# Patient Record
Sex: FEMALE | Race: WHITE | HISPANIC OR LATINO
[De-identification: names, ages, dates, MRNs, and addresses within clinical notes are randomized per-mention and may not be internally consistent; named-entity substitution may affect disease eponyms.]

---

## 2017-04-24 ENCOUNTER — APPOINTMENT (OUTPATIENT)
Dept: SURGICAL ONCOLOGY | Facility: CLINIC | Age: 72
End: 2017-04-24

## 2017-04-24 VITALS
RESPIRATION RATE: 15 BRPM | WEIGHT: 95 LBS | DIASTOLIC BLOOD PRESSURE: 66 MMHG | SYSTOLIC BLOOD PRESSURE: 103 MMHG | BODY MASS INDEX: 17.94 KG/M2 | HEART RATE: 67 BPM | HEIGHT: 61 IN

## 2017-11-02 ENCOUNTER — FORM ENCOUNTER (OUTPATIENT)
Age: 72
End: 2017-11-02

## 2017-11-03 ENCOUNTER — FORM ENCOUNTER (OUTPATIENT)
Age: 72
End: 2017-11-03

## 2017-11-03 ENCOUNTER — OUTPATIENT (OUTPATIENT)
Dept: OUTPATIENT SERVICES | Facility: HOSPITAL | Age: 72
LOS: 1 days | End: 2017-11-03
Payer: MEDICARE

## 2017-11-03 ENCOUNTER — APPOINTMENT (OUTPATIENT)
Dept: MAMMOGRAPHY | Facility: CLINIC | Age: 72
End: 2017-11-03

## 2017-11-03 DIAGNOSIS — Z00.8 ENCOUNTER FOR OTHER GENERAL EXAMINATION: ICD-10-CM

## 2017-11-03 PROCEDURE — G0202: CPT | Mod: 26

## 2017-11-03 PROCEDURE — 77067 SCR MAMMO BI INCL CAD: CPT

## 2017-11-03 PROCEDURE — 77063 BREAST TOMOSYNTHESIS BI: CPT | Mod: 26

## 2017-11-03 PROCEDURE — 77063 BREAST TOMOSYNTHESIS BI: CPT

## 2017-11-04 ENCOUNTER — APPOINTMENT (OUTPATIENT)
Dept: CARDIOLOGY | Facility: CLINIC | Age: 72
End: 2017-11-04

## 2017-11-04 ENCOUNTER — APPOINTMENT (OUTPATIENT)
Dept: CV DIAGNOSITCS | Facility: HOSPITAL | Age: 72
End: 2017-11-04

## 2017-11-04 ENCOUNTER — OUTPATIENT (OUTPATIENT)
Dept: OUTPATIENT SERVICES | Facility: HOSPITAL | Age: 72
LOS: 1 days | End: 2017-11-04
Payer: SUBSIDIZED

## 2017-11-04 DIAGNOSIS — Z00.00 ENCOUNTER FOR GENERAL ADULT MEDICAL EXAMINATION WITHOUT ABNORMAL FINDINGS: ICD-10-CM

## 2017-11-04 PROCEDURE — 93306 TTE W/DOPPLER COMPLETE: CPT

## 2017-11-04 PROCEDURE — 75571 CT HRT W/O DYE W/CA TEST: CPT

## 2017-11-04 PROCEDURE — 75571 CT HRT W/O DYE W/CA TEST: CPT | Mod: 26

## 2017-11-04 PROCEDURE — 93010 ELECTROCARDIOGRAM REPORT: CPT

## 2017-11-04 PROCEDURE — 93306 TTE W/DOPPLER COMPLETE: CPT | Mod: 26

## 2017-11-04 PROCEDURE — 93005 ELECTROCARDIOGRAM TRACING: CPT

## 2017-11-06 ENCOUNTER — RESULT CHARGE (OUTPATIENT)
Age: 72
End: 2017-11-06

## 2017-11-07 ENCOUNTER — OTHER (OUTPATIENT)
Age: 72
End: 2017-11-07

## 2017-11-07 DIAGNOSIS — Z00.00 ENCOUNTER FOR GENERAL ADULT MEDICAL EXAMINATION W/OUT ABNORMAL FINDINGS: ICD-10-CM

## 2018-01-19 ENCOUNTER — APPOINTMENT (OUTPATIENT)
Dept: RADIOLOGY | Facility: CLINIC | Age: 73
End: 2018-01-19

## 2018-01-19 ENCOUNTER — OUTPATIENT (OUTPATIENT)
Dept: OUTPATIENT SERVICES | Facility: HOSPITAL | Age: 73
LOS: 1 days | End: 2018-01-19
Payer: MEDICARE

## 2018-01-19 DIAGNOSIS — Z00.8 ENCOUNTER FOR OTHER GENERAL EXAMINATION: ICD-10-CM

## 2018-01-19 PROCEDURE — 77080 DXA BONE DENSITY AXIAL: CPT

## 2018-01-19 PROCEDURE — 77080 DXA BONE DENSITY AXIAL: CPT | Mod: 26

## 2018-05-14 ENCOUNTER — APPOINTMENT (OUTPATIENT)
Dept: SURGICAL ONCOLOGY | Facility: CLINIC | Age: 73
End: 2018-05-14
Payer: MEDICARE

## 2018-05-14 VITALS
WEIGHT: 96 LBS | DIASTOLIC BLOOD PRESSURE: 64 MMHG | SYSTOLIC BLOOD PRESSURE: 101 MMHG | BODY MASS INDEX: 18.12 KG/M2 | HEIGHT: 61 IN | OXYGEN SATURATION: 98 % | HEART RATE: 65 BPM

## 2018-05-14 PROCEDURE — 99213 OFFICE O/P EST LOW 20 MIN: CPT

## 2018-05-14 RX ORDER — ALENDRONATE SODIUM 70 MG/1
70 TABLET ORAL
Qty: 12 | Refills: 0 | Status: ACTIVE | COMMUNITY
Start: 2018-03-05

## 2018-06-01 ENCOUNTER — APPOINTMENT (OUTPATIENT)
Dept: ENDOCRINOLOGY | Facility: CLINIC | Age: 73
End: 2018-06-01

## 2018-11-14 ENCOUNTER — FORM ENCOUNTER (OUTPATIENT)
Age: 73
End: 2018-11-14

## 2018-11-15 ENCOUNTER — OUTPATIENT (OUTPATIENT)
Dept: OUTPATIENT SERVICES | Facility: HOSPITAL | Age: 73
LOS: 1 days | End: 2018-11-15
Payer: MEDICARE

## 2018-11-15 ENCOUNTER — APPOINTMENT (OUTPATIENT)
Dept: MAMMOGRAPHY | Facility: CLINIC | Age: 73
End: 2018-11-15
Payer: MEDICARE

## 2018-11-15 ENCOUNTER — APPOINTMENT (OUTPATIENT)
Dept: ULTRASOUND IMAGING | Facility: CLINIC | Age: 73
End: 2018-11-15
Payer: MEDICARE

## 2018-11-15 DIAGNOSIS — Z00.8 ENCOUNTER FOR OTHER GENERAL EXAMINATION: ICD-10-CM

## 2018-11-15 PROCEDURE — 77063 BREAST TOMOSYNTHESIS BI: CPT | Mod: 26

## 2018-11-15 PROCEDURE — 76641 ULTRASOUND BREAST COMPLETE: CPT | Mod: 26,50

## 2018-11-15 PROCEDURE — 77067 SCR MAMMO BI INCL CAD: CPT | Mod: 26

## 2018-11-15 PROCEDURE — 77063 BREAST TOMOSYNTHESIS BI: CPT

## 2018-11-15 PROCEDURE — 77067 SCR MAMMO BI INCL CAD: CPT

## 2018-11-15 PROCEDURE — 76641 ULTRASOUND BREAST COMPLETE: CPT

## 2019-06-02 RX ORDER — CALCIUM CARBONATE/VITAMIN D3 600 MG-20
600-800 TABLET,CHEWABLE ORAL
Refills: 0 | Status: ACTIVE | COMMUNITY

## 2019-06-03 ENCOUNTER — APPOINTMENT (OUTPATIENT)
Dept: SURGICAL ONCOLOGY | Facility: CLINIC | Age: 74
End: 2019-06-03
Payer: MEDICARE

## 2019-06-03 VITALS
HEIGHT: 61 IN | HEART RATE: 60 BPM | BODY MASS INDEX: 18.31 KG/M2 | SYSTOLIC BLOOD PRESSURE: 122 MMHG | OXYGEN SATURATION: 99 % | WEIGHT: 97 LBS | DIASTOLIC BLOOD PRESSURE: 79 MMHG

## 2019-06-03 PROCEDURE — 99214 OFFICE O/P EST MOD 30 MIN: CPT

## 2019-06-04 ENCOUNTER — TRANSCRIPTION ENCOUNTER (OUTPATIENT)
Age: 74
End: 2019-06-04

## 2019-11-17 ENCOUNTER — FORM ENCOUNTER (OUTPATIENT)
Age: 74
End: 2019-11-17

## 2019-11-18 ENCOUNTER — APPOINTMENT (OUTPATIENT)
Dept: ULTRASOUND IMAGING | Facility: CLINIC | Age: 74
End: 2019-11-18
Payer: MEDICARE

## 2019-11-18 ENCOUNTER — OUTPATIENT (OUTPATIENT)
Dept: OUTPATIENT SERVICES | Facility: HOSPITAL | Age: 74
LOS: 1 days | End: 2019-11-18
Payer: MEDICARE

## 2019-11-18 ENCOUNTER — APPOINTMENT (OUTPATIENT)
Dept: MAMMOGRAPHY | Facility: CLINIC | Age: 74
End: 2019-11-18
Payer: MEDICARE

## 2019-11-18 DIAGNOSIS — Z00.8 ENCOUNTER FOR OTHER GENERAL EXAMINATION: ICD-10-CM

## 2019-11-18 PROCEDURE — 77063 BREAST TOMOSYNTHESIS BI: CPT | Mod: 26

## 2019-11-18 PROCEDURE — 76641 ULTRASOUND BREAST COMPLETE: CPT | Mod: 26,50

## 2019-11-18 PROCEDURE — 77067 SCR MAMMO BI INCL CAD: CPT | Mod: 26

## 2019-11-18 PROCEDURE — 77063 BREAST TOMOSYNTHESIS BI: CPT

## 2019-11-18 PROCEDURE — 76641 ULTRASOUND BREAST COMPLETE: CPT

## 2019-11-18 PROCEDURE — 77067 SCR MAMMO BI INCL CAD: CPT

## 2020-06-08 ENCOUNTER — APPOINTMENT (OUTPATIENT)
Dept: SURGICAL ONCOLOGY | Facility: CLINIC | Age: 75
End: 2020-06-08

## 2020-09-29 NOTE — ASSESSMENT
[FreeTextEntry1] : 11-15-18.\par Bilateral mammogram and breast ultrasound @[facility] were normal, BI-RADS 2.\par Yearly breast imaging will be November 2019, Prescription entered today\par \par A breast MRI January 2015 @[facility], was BI-RADS-2.\par \par Clinically she is doing well and asked to see her in another year, sooner if needed\par \par \par 11/18/19:\par Annual bilateral mammogram and sonogram @[facility]: BI-RADS 2.\par Prescription for November 2020 entered 04/19/20\par \par \par

## 2020-09-29 NOTE — PHYSICAL EXAM
[Normal] : supple, no neck mass and thyroid not enlarged [Normal Neck Lymph Nodes] : normal neck lymph nodes  [Normal Supraclavicular Lymph Nodes] : normal supraclavicular lymph nodes [Normal Axillary Lymph Nodes] : normal axillary lymph nodes [Normal] : normal appearance, no rash, nodules, vesicles, ulcers, erythema [de-identified] : Groins not examined [de-identified] : see below

## 2020-09-29 NOTE — REVIEW OF SYSTEMS
[Negative] : Integumentary [FreeTextEntry5] : Hypertension [de-identified] : Osteoporosis [FreeTextEntry1] : Breast cancer

## 2020-09-29 NOTE — HISTORY OF PRESENT ILLNESS
[de-identified] : 73 year-old lady who March 2005 had right breast conserving therapy for stage I right breast cancer.([id number]). \par She received radiation therapy from Dr. Pat Rosa and saw Dr. Alfredito Roy from medical oncology who recommended anastrozole. \par She has been followed carefully clinically and is asymptomatic.\par \par No relatives with breast or ovarian cancer\par \par Gynecologist is Dr. Starla KRAMER: December 2017 was unremarkable\par \par Internist was Dr. Lela Benitez\par NOW: DR Lit MARTE\par \par She does not have a pacemaker defibrillator.\par She takes no anticoagulants.\par \par Blood pressure is controlled with diet.\par She takes Crestor for hyperlipidemia.\par Osteoporosis is treated with alendronate, and vitamin D and calcium supplements.\par \par Colonoscopy Dr. Grayson: August 2018 was unremarkable.\par A repeat in 5 years, 2023.............................\par \par Her  passed away October 2017 (see below)\par (Her  has had a very protracted course of the right carotid endarterectomy in November 2016 by Dr. 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 \par He required a PEG tube for feeding purposes, but that migrated into the colon. \par He developed hematuria  from his previous prostate Procedures and required CBI and blood transfusions, Under the supervision of Dr. Gary Goldberg. )\par

## 2020-11-19 ENCOUNTER — RESULT REVIEW (OUTPATIENT)
Age: 75
End: 2020-11-19

## 2020-11-19 ENCOUNTER — APPOINTMENT (OUTPATIENT)
Dept: ULTRASOUND IMAGING | Facility: CLINIC | Age: 75
End: 2020-11-19
Payer: MEDICARE

## 2020-11-19 ENCOUNTER — OUTPATIENT (OUTPATIENT)
Dept: OUTPATIENT SERVICES | Facility: HOSPITAL | Age: 75
LOS: 1 days | End: 2020-11-19
Payer: MEDICARE

## 2020-11-19 ENCOUNTER — APPOINTMENT (OUTPATIENT)
Dept: MAMMOGRAPHY | Facility: CLINIC | Age: 75
End: 2020-11-19
Payer: MEDICARE

## 2020-11-19 DIAGNOSIS — Z00.8 ENCOUNTER FOR OTHER GENERAL EXAMINATION: ICD-10-CM

## 2020-11-19 PROCEDURE — 77067 SCR MAMMO BI INCL CAD: CPT | Mod: 26

## 2020-11-19 PROCEDURE — 76641 ULTRASOUND BREAST COMPLETE: CPT

## 2020-11-19 PROCEDURE — 76641 ULTRASOUND BREAST COMPLETE: CPT | Mod: 26,50

## 2020-11-19 PROCEDURE — 77063 BREAST TOMOSYNTHESIS BI: CPT | Mod: 26

## 2020-11-19 PROCEDURE — 77067 SCR MAMMO BI INCL CAD: CPT

## 2020-11-19 PROCEDURE — 77063 BREAST TOMOSYNTHESIS BI: CPT

## 2020-12-03 ENCOUNTER — APPOINTMENT (OUTPATIENT)
Dept: SURGICAL ONCOLOGY | Facility: CLINIC | Age: 75
End: 2020-12-03
Payer: MEDICARE

## 2020-12-03 VITALS
HEIGHT: 61 IN | BODY MASS INDEX: 18.5 KG/M2 | SYSTOLIC BLOOD PRESSURE: 124 MMHG | WEIGHT: 98 LBS | DIASTOLIC BLOOD PRESSURE: 78 MMHG | HEART RATE: 73 BPM | OXYGEN SATURATION: 97 %

## 2020-12-03 PROCEDURE — 99214 OFFICE O/P EST MOD 30 MIN: CPT

## 2020-12-03 NOTE — ASSESSMENT
[FreeTextEntry1] : Clinically doing well.\par \par January 2015 breast : BI-RADS 2.\par No specific MRI follow-up recommended.\par \par November 2020:\par Bilateral mammogram and sonogram at [facility]: BI-RADS 2.\par Prescription entered for November 2021.\par \par We should see her after the above imaging, sooner if needed

## 2020-12-03 NOTE — HISTORY OF PRESENT ILLNESS
[de-identified] : 75 year-old lady who 2005 had RIGHT BREAST conserving therapy for stage I right breast cancer.([id number]). \par \par + XRT: Dr. Pat Rosa\par Medical oncology: Dr. Alfredito Roy:  Anastrozole. \par \par She has been followed carefully clinically and is asymptomatic.\par \par + FH:\par A paternal cousin had breast cancer at age 35, and  shortly thereafter.\par A maternal cousin had breast cancer in her 50s.\par \par No relatives with ovarian cancer.\par \par Not Ashkenazi.\par \par At least 3 paternal aunts had intra-abdominal malignancies, no further details available.\par \par \par In the spring 2019 she lost her hearing in her left ear.\par She saw an ENT specialist, but does not recall the details.\par Irrespective, no cause or diagnosis was ever established.\par \par \par Internist was Dr. Lela Benitez\par NOW: DR Lit MARTE\par \par She does not have a pacemaker defibrillator.\par She takes no anticoagulants.\par \par Blood pressure is controlled with diet.\par She takes Crestor for hyperlipidemia.\par She does not see a cardiologist\par \par Osteoporosis is treated with alendronate, and vitamin D and calcium supplements.\par Her endocrinologist is at [place]\par \par \par Gynecologist is Dr. Starla KRAMER: 2018 was unremarkable.\par Follow-up pending\par \par \par \par Colonoscopy Dr. Andrews Grayson: 2018 was unremarkable.\par Will repeat in 5 years, .............................\par \par Her  passed away 2017 (see below)\par (Her  has had a very protracted course of the right carotid endarterectomy in 2016 by Dr. 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 \par He required a PEG tube for feeding purposes, but that migrated into the colon. \par He developed hematuria  from his previous prostate Procedures and required CBI and blood transfusions, Under the supervision of Dr. Gary Goldberg. )\par

## 2020-12-03 NOTE — REASON FOR VISIT
[Follow-Up Visit] : a follow-up visit for [Other: _____] : [unfilled] [FreeTextEntry2] : Stage I right breast cancer

## 2020-12-03 NOTE — REVIEW OF SYSTEMS
[Negative] : Integumentary [FreeTextEntry5] : Hypertension [de-identified] : Osteoporosis [FreeTextEntry1] : Breast cancer

## 2020-12-03 NOTE — PHYSICAL EXAM
[Normal] : supple, no neck mass and thyroid not enlarged [Normal Neck Lymph Nodes] : normal neck lymph nodes  [Normal Supraclavicular Lymph Nodes] : normal supraclavicular lymph nodes [Normal Axillary Lymph Nodes] : normal axillary lymph nodes [Normal] : normal appearance, no rash, nodules, vesicles, ulcers, erythema [de-identified] : Groins not examined [de-identified] : see below

## 2021-06-04 ENCOUNTER — RESULT REVIEW (OUTPATIENT)
Age: 76
End: 2021-06-04

## 2021-10-29 ENCOUNTER — TRANSCRIPTION ENCOUNTER (OUTPATIENT)
Age: 76
End: 2021-10-29

## 2021-11-22 ENCOUNTER — APPOINTMENT (OUTPATIENT)
Dept: MAMMOGRAPHY | Facility: CLINIC | Age: 76
End: 2021-11-22
Payer: MEDICARE

## 2021-11-22 ENCOUNTER — RESULT REVIEW (OUTPATIENT)
Age: 76
End: 2021-11-22

## 2021-11-22 ENCOUNTER — OUTPATIENT (OUTPATIENT)
Dept: OUTPATIENT SERVICES | Facility: HOSPITAL | Age: 76
LOS: 1 days | End: 2021-11-22
Payer: MEDICARE

## 2021-11-22 ENCOUNTER — APPOINTMENT (OUTPATIENT)
Dept: ULTRASOUND IMAGING | Facility: CLINIC | Age: 76
End: 2021-11-22
Payer: MEDICARE

## 2021-11-22 DIAGNOSIS — C50.911 MALIGNANT NEOPLASM OF UNSPECIFIED SITE OF RIGHT FEMALE BREAST: ICD-10-CM

## 2021-11-22 PROCEDURE — 76641 ULTRASOUND BREAST COMPLETE: CPT | Mod: 26,50

## 2021-11-22 PROCEDURE — 77067 SCR MAMMO BI INCL CAD: CPT | Mod: 26

## 2021-11-22 PROCEDURE — 76641 ULTRASOUND BREAST COMPLETE: CPT

## 2021-11-22 PROCEDURE — 77067 SCR MAMMO BI INCL CAD: CPT

## 2021-11-22 PROCEDURE — 77063 BREAST TOMOSYNTHESIS BI: CPT | Mod: 26

## 2021-11-22 PROCEDURE — 77063 BREAST TOMOSYNTHESIS BI: CPT

## 2021-12-07 ENCOUNTER — TRANSCRIPTION ENCOUNTER (OUTPATIENT)
Age: 76
End: 2021-12-07

## 2021-12-27 ENCOUNTER — TRANSCRIPTION ENCOUNTER (OUTPATIENT)
Age: 76
End: 2021-12-27

## 2022-01-03 ENCOUNTER — APPOINTMENT (OUTPATIENT)
Dept: SURGICAL ONCOLOGY | Facility: CLINIC | Age: 77
End: 2022-01-03
Payer: MEDICARE

## 2022-01-03 VITALS
SYSTOLIC BLOOD PRESSURE: 144 MMHG | RESPIRATION RATE: 17 BRPM | DIASTOLIC BLOOD PRESSURE: 79 MMHG | WEIGHT: 100 LBS | OXYGEN SATURATION: 99 % | HEIGHT: 61 IN | HEART RATE: 56 BPM | TEMPERATURE: 98.1 F | BODY MASS INDEX: 18.88 KG/M2

## 2022-01-03 PROCEDURE — 99214 OFFICE O/P EST MOD 30 MIN: CPT

## 2022-01-03 NOTE — ASSESSMENT
[FreeTextEntry1] : Clinically doing well.\par \par January 2015 breast : BI-RADS 2.\par No specific MRI follow-up recommended.\par \par November 2021:\par Bilateral mammogram and sonogram at [facility]: BI-RADS 2.\par Prescription entered for November 2022.\par \par We should see her after the above imaging, sooner if needed

## 2022-01-03 NOTE — PHYSICAL EXAM
[Normal] : supple, no neck mass and thyroid not enlarged [Normal Neck Lymph Nodes] : normal neck lymph nodes  [Normal Supraclavicular Lymph Nodes] : normal supraclavicular lymph nodes [Normal Axillary Lymph Nodes] : normal axillary lymph nodes [Normal] : normal appearance, no rash, nodules, vesicles, ulcers, erythema [de-identified] : Groins not examined [de-identified] : see below

## 2022-01-03 NOTE — HISTORY OF PRESENT ILLNESS
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 \par \par + XRT: Dr. Pat COELLO\par Medical oncology: Dr. Alfredito CONCEPCION:  Anastrozole. \par \par She has been followed carefully clinically and is asymptomatic.\par \par + FH:\par A paternal cousin had breast cancer at age 35, and  shortly thereafter.\par A maternal cousin had breast cancer in her 50s.\par \par No relatives with ovarian cancer.\par \par Not Ashkenazi.\par \par At least 3 paternal aunts had intra-abdominal malignancies, no further details available.\par \par \par Reproductive history:\par Menarche at 14.\par  2, para 2, first birth at 31.\par Natural menopause at 50.\par No HRT.\par \par \par Genetic testing:..............\par \par \par \par In the spring 2019 she lost her hearing in her left ear.\par She saw an ENT specialist, but does not recall the details.\par Irrespective, no cause or diagnosis was ever established.\par \par \par Internist was Dr. Lela Benitez\par NOW: DR Lit MARTE\par \par She does not have a pacemaker defibrillator.\par She takes no anticoagulants.\par \par Blood pressure is controlled with diet.\par She takes Crestor for hyperlipidemia.\par She does not see a cardiologist\par \par Osteoporosis is treated with alendronate, and vitamin D and calcium supplements.\par Her endocrinologist is at [place].\par \par She has been diagnosed as "prediabetic" by her internist.\par This is being followed clinically.\par \par  she developed diminished hearing in her left ear from an unclear etiology.\par She now wears a unilateral hearing aid.\par ENT: Dr. Arturo HENSLEY\par \par \par Gynecologist is Dr. Starla KRAMER: 2021 was unremarkable.\par \par \par Colonoscopy Dr. Andrews Grayson: 2018 was unremarkable.\par Will repeat in 5 years, .............................\par \par Her  passed away 2017 (see below)\par (Her  has had a very protracted course of the right carotid endarterectomy in 2016 by Dr. 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 \par He required a PEG tube for feeding purposes, but that migrated into the colon. \par He developed hematuria  from his previous prostate Procedures and required CBI and blood transfusions, Under the supervision of Dr. Gary Goldberg. )\par

## 2022-01-03 NOTE — REVIEW OF SYSTEMS
[Negative] : Integumentary [FreeTextEntry5] : Hypercholesterolemia [de-identified] : Osteoporosis [FreeTextEntry1] : Breast cancer

## 2022-01-03 NOTE — REASON FOR VISIT
[Follow-Up Visit] : a follow-up visit for [Other: _____] : [unfilled] [FreeTextEntry2] : Stage I right breast cancer (annual visit)

## 2022-11-15 ENCOUNTER — NON-APPOINTMENT (OUTPATIENT)
Age: 77
End: 2022-11-15

## 2022-11-28 ENCOUNTER — APPOINTMENT (OUTPATIENT)
Dept: MAMMOGRAPHY | Facility: CLINIC | Age: 77
End: 2022-11-28

## 2022-11-28 ENCOUNTER — RESULT REVIEW (OUTPATIENT)
Age: 77
End: 2022-11-28

## 2022-11-28 ENCOUNTER — APPOINTMENT (OUTPATIENT)
Dept: ULTRASOUND IMAGING | Facility: CLINIC | Age: 77
End: 2022-11-28

## 2022-11-28 ENCOUNTER — OUTPATIENT (OUTPATIENT)
Dept: OUTPATIENT SERVICES | Facility: HOSPITAL | Age: 77
LOS: 1 days | End: 2022-11-28
Payer: MEDICARE

## 2022-11-28 DIAGNOSIS — C50.911 MALIGNANT NEOPLASM OF UNSPECIFIED SITE OF RIGHT FEMALE BREAST: ICD-10-CM

## 2022-11-28 PROCEDURE — 77067 SCR MAMMO BI INCL CAD: CPT | Mod: 26

## 2022-11-28 PROCEDURE — 76641 ULTRASOUND BREAST COMPLETE: CPT | Mod: 26,50

## 2022-11-28 PROCEDURE — 77063 BREAST TOMOSYNTHESIS BI: CPT

## 2022-11-28 PROCEDURE — 76641 ULTRASOUND BREAST COMPLETE: CPT

## 2022-11-28 PROCEDURE — 77063 BREAST TOMOSYNTHESIS BI: CPT | Mod: 26

## 2022-11-28 PROCEDURE — 77067 SCR MAMMO BI INCL CAD: CPT

## 2023-01-03 ENCOUNTER — NON-APPOINTMENT (OUTPATIENT)
Age: 78
End: 2023-01-03

## 2023-01-04 ENCOUNTER — EMERGENCY (EMERGENCY)
Facility: HOSPITAL | Age: 78
LOS: 1 days | Discharge: HOME | End: 2023-01-04
Attending: EMERGENCY MEDICINE | Admitting: EMERGENCY MEDICINE
Payer: MEDICARE

## 2023-01-04 VITALS
SYSTOLIC BLOOD PRESSURE: 149 MMHG | OXYGEN SATURATION: 98 % | RESPIRATION RATE: 16 BRPM | DIASTOLIC BLOOD PRESSURE: 75 MMHG | TEMPERATURE: 100 F | HEART RATE: 84 BPM | WEIGHT: 100.09 LBS

## 2023-01-04 VITALS
HEART RATE: 62 BPM | OXYGEN SATURATION: 97 % | TEMPERATURE: 99 F | RESPIRATION RATE: 17 BRPM | SYSTOLIC BLOOD PRESSURE: 137 MMHG | DIASTOLIC BLOOD PRESSURE: 86 MMHG

## 2023-01-04 LAB
ALBUMIN SERPL ELPH-MCNC: 3.5 G/DL — SIGNIFICANT CHANGE UP (ref 3.3–5)
ALP SERPL-CCNC: 54 U/L — SIGNIFICANT CHANGE UP (ref 40–120)
ALT FLD-CCNC: 35 U/L — SIGNIFICANT CHANGE UP (ref 12–78)
ANION GAP SERPL CALC-SCNC: 7 MMOL/L — SIGNIFICANT CHANGE UP (ref 5–17)
AST SERPL-CCNC: 30 U/L — SIGNIFICANT CHANGE UP (ref 15–37)
BASOPHILS # BLD AUTO: 0.03 K/UL — SIGNIFICANT CHANGE UP (ref 0–0.2)
BASOPHILS NFR BLD AUTO: 0.6 % — SIGNIFICANT CHANGE UP (ref 0–2)
BILIRUB SERPL-MCNC: 0.5 MG/DL — SIGNIFICANT CHANGE UP (ref 0.2–1.2)
BUN SERPL-MCNC: 13 MG/DL — SIGNIFICANT CHANGE UP (ref 7–23)
CALCIUM SERPL-MCNC: 9 MG/DL — SIGNIFICANT CHANGE UP (ref 8.5–10.1)
CHLORIDE SERPL-SCNC: 107 MMOL/L — SIGNIFICANT CHANGE UP (ref 96–108)
CO2 SERPL-SCNC: 27 MMOL/L — SIGNIFICANT CHANGE UP (ref 22–31)
CREAT SERPL-MCNC: 0.63 MG/DL — SIGNIFICANT CHANGE UP (ref 0.5–1.3)
EGFR: 92 ML/MIN/1.73M2 — SIGNIFICANT CHANGE UP
EOSINOPHIL # BLD AUTO: 0.06 K/UL — SIGNIFICANT CHANGE UP (ref 0–0.5)
EOSINOPHIL NFR BLD AUTO: 1.3 % — SIGNIFICANT CHANGE UP (ref 0–6)
GLUCOSE SERPL-MCNC: 89 MG/DL — SIGNIFICANT CHANGE UP (ref 70–99)
HCT VFR BLD CALC: 42.3 % — SIGNIFICANT CHANGE UP (ref 34.5–45)
HGB BLD-MCNC: 13.9 G/DL — SIGNIFICANT CHANGE UP (ref 11.5–15.5)
IMM GRANULOCYTES NFR BLD AUTO: 0.2 % — SIGNIFICANT CHANGE UP (ref 0–0.9)
LYMPHOCYTES # BLD AUTO: 1.32 K/UL — SIGNIFICANT CHANGE UP (ref 1–3.3)
LYMPHOCYTES # BLD AUTO: 28 % — SIGNIFICANT CHANGE UP (ref 13–44)
MCHC RBC-ENTMCNC: 29.6 PG — SIGNIFICANT CHANGE UP (ref 27–34)
MCHC RBC-ENTMCNC: 32.9 GM/DL — SIGNIFICANT CHANGE UP (ref 32–36)
MCV RBC AUTO: 90 FL — SIGNIFICANT CHANGE UP (ref 80–100)
MONOCYTES # BLD AUTO: 0.28 K/UL — SIGNIFICANT CHANGE UP (ref 0–0.9)
MONOCYTES NFR BLD AUTO: 5.9 % — SIGNIFICANT CHANGE UP (ref 2–14)
NEUTROPHILS # BLD AUTO: 3.02 K/UL — SIGNIFICANT CHANGE UP (ref 1.8–7.4)
NEUTROPHILS NFR BLD AUTO: 64 % — SIGNIFICANT CHANGE UP (ref 43–77)
NRBC # BLD: 0 /100 WBCS — SIGNIFICANT CHANGE UP (ref 0–0)
PLATELET # BLD AUTO: 207 K/UL — SIGNIFICANT CHANGE UP (ref 150–400)
POTASSIUM SERPL-MCNC: 3.6 MMOL/L — SIGNIFICANT CHANGE UP (ref 3.5–5.3)
POTASSIUM SERPL-SCNC: 3.6 MMOL/L — SIGNIFICANT CHANGE UP (ref 3.5–5.3)
PROT SERPL-MCNC: 6.7 G/DL — SIGNIFICANT CHANGE UP (ref 6–8.3)
RBC # BLD: 4.7 M/UL — SIGNIFICANT CHANGE UP (ref 3.8–5.2)
RBC # FLD: 12.9 % — SIGNIFICANT CHANGE UP (ref 10.3–14.5)
SODIUM SERPL-SCNC: 141 MMOL/L — SIGNIFICANT CHANGE UP (ref 135–145)
WBC # BLD: 4.72 K/UL — SIGNIFICANT CHANGE UP (ref 3.8–10.5)
WBC # FLD AUTO: 4.72 K/UL — SIGNIFICANT CHANGE UP (ref 3.8–10.5)

## 2023-01-04 PROCEDURE — 99284 EMERGENCY DEPT VISIT MOD MDM: CPT | Mod: 25

## 2023-01-04 PROCEDURE — 70481 CT ORBIT/EAR/FOSSA W/DYE: CPT | Mod: 26,ME

## 2023-01-04 PROCEDURE — 96366 THER/PROPH/DIAG IV INF ADDON: CPT

## 2023-01-04 PROCEDURE — 80053 COMPREHEN METABOLIC PANEL: CPT

## 2023-01-04 PROCEDURE — 36415 COLL VENOUS BLD VENIPUNCTURE: CPT

## 2023-01-04 PROCEDURE — 85025 COMPLETE CBC W/AUTO DIFF WBC: CPT

## 2023-01-04 PROCEDURE — G1004: CPT

## 2023-01-04 PROCEDURE — 99285 EMERGENCY DEPT VISIT HI MDM: CPT | Mod: FS

## 2023-01-04 PROCEDURE — 96365 THER/PROPH/DIAG IV INF INIT: CPT | Mod: XU

## 2023-01-04 PROCEDURE — 70481 CT ORBIT/EAR/FOSSA W/DYE: CPT | Mod: ME

## 2023-01-04 RX ORDER — AMPICILLIN SODIUM AND SULBACTAM SODIUM 250; 125 MG/ML; MG/ML
3 INJECTION, POWDER, FOR SUSPENSION INTRAMUSCULAR; INTRAVENOUS ONCE
Refills: 0 | Status: COMPLETED | OUTPATIENT
Start: 2023-01-04 | End: 2023-01-04

## 2023-01-04 RX ORDER — ERYTHROMYCIN BASE 5 MG/GRAM
1 OINTMENT (GRAM) OPHTHALMIC (EYE)
Qty: 1 | Refills: 0
Start: 2023-01-04 | End: 2023-01-08

## 2023-01-04 RX ORDER — SODIUM CHLORIDE 9 MG/ML
1000 INJECTION INTRAMUSCULAR; INTRAVENOUS; SUBCUTANEOUS ONCE
Refills: 0 | Status: COMPLETED | OUTPATIENT
Start: 2023-01-04 | End: 2023-01-04

## 2023-01-04 RX ADMIN — AMPICILLIN SODIUM AND SULBACTAM SODIUM 200 GRAM(S): 250; 125 INJECTION, POWDER, FOR SUSPENSION INTRAMUSCULAR; INTRAVENOUS at 21:11

## 2023-01-04 RX ADMIN — SODIUM CHLORIDE 2000 MILLILITER(S): 9 INJECTION INTRAMUSCULAR; INTRAVENOUS; SUBCUTANEOUS at 21:11

## 2023-01-04 NOTE — ED PROVIDER NOTE - OBJECTIVE STATEMENT
77-year-old female sent in from urgent care for redness and swelling around left eye which started a few days ago.  Patient states that she had some discharge from left eye.  Denies fever, chills, eye pain, visual changes, Chest pain, shortness of breath, headache, dizziness, syncope, abdominal pain, nausea, vomiting, upper or lower extremity weakness or paresthesias.  Patient wears glasses, no contacts.  No trauma.

## 2023-01-04 NOTE — ED PROVIDER NOTE - EYE, LEFT
+ swelling to left lower eyelid, no discharge, + swelling and erythema surround left lower eye./pupils equal, round, and reactive to light/CONJUNCTIVA ERYTHEMA

## 2023-01-04 NOTE — ED PROVIDER NOTE - NS ED ATTENDING STATEMENT MOD
This was a shared visit with the NGOZI. I reviewed and verified the documentation and independently performed the documented:

## 2023-01-04 NOTE — ED PROVIDER NOTE - NSFOLLOWUPINSTRUCTIONS_ED_ALL_ED_FT
Periorbital Cellulitis    WHAT YOU NEED TO KNOW:    Periorbital cellulitis, or preseptal cellulitis, is a bacterial infection of your eyelid or the skin around your eye. . The infection can develop from a scratch or insect bite around the eye. Periorbital cellulitis may cause vision problems. It should be treated as soon as possible to prevent complications.   Cellulitis of the Eye         DISCHARGE INSTRUCTIONS:    Return to the emergency department if:   •You lose vision in your infected eye.      •You have vision problems, such as double vision.      •You have difficulty moving your eyeball.      •You cannot close your eye due to swelling.      •You develop a headache and are vomiting.      •You have a stiff neck.      •You see red streaks coming from the infected area.      Call your doctor if:   •Your symptoms do not get better within 24 hours of treatment.      •The red, warm, swollen area gets larger.      •Your fever or pain does not go away or gets worse.      •You have questions or concerns about your condition or care.      Medicines: If your periorbital cellulitis is severe, you may need IV antibiotics in the hospital. If the infection is not treated, it can spread through your body and become life-threatening. You may need any of the following medicines:  •Antibiotics help treat a bacterial infection.      •Acetaminophen decreases pain and fever. It is available without a doctor's order. Ask how much to take and how often to take it. Follow directions. Read the labels of all other medicines you are using to see if they also contain acetaminophen, or ask your doctor or pharmacist. Acetaminophen can cause liver damage if not taken correctly.      •NSAIDs, such as ibuprofen, help decrease swelling, pain, and fever. This medicine is available with or without a doctor's order. NSAIDs can cause stomach bleeding or kidney problems in certain people. If you take blood thinner medicine, always ask your healthcare provider if NSAIDs are safe for you. Always read the medicine label and follow directions.      •Take your medicine as directed. Contact your healthcare provider if you think your medicine is not helping or if you have side effects. Tell your provider if you are allergic to any medicine. Keep a list of the medicines, vitamins, and herbs you take. Include the amounts, and when and why you take them. Bring the list or the pill bottles to follow-up visits. Carry your medicine list with you in case of an emergency.      Self-care:   •Wash the area with soap and water every day. Gently pat dry. Use bandages if directed by your healthcare provider.      •Do not rub or scratch your eyes. This can increase your risk for spreading the infection.       •Place a cool, damp cloth on the area. Use clean cloths and clean water. You can do this as often as you need to. Cool, damp cloths may help decrease pain.      •Wash your hands often. Use soap and water. Wash your hands after you use the bathroom, change a child's diapers, or sneeze. Wash your hands before you prepare or eat food. Use lotion to prevent dry, cracked skin.  Handwashing           Prevent another eye infection:   •Wear proper safety equipment. Protect your face from injury during sports and other activities.      •Keep wounds clean and dry. Clean wounds on the face with soap and water. Cover wounds with a dry bandage if needed.       Follow up with your doctor or ophthalmologist as directed: He or she will check if your cellulitis is getting better. Write down your questions so you remember to ask them during your visits.

## 2023-01-04 NOTE — ED PROVIDER NOTE - CLINICAL SUMMARY MEDICAL DECISION MAKING FREE TEXT BOX
76 y/o F sent in from urgent care for left eye redness.  pt with conjunctival injection and surrounding edema/erythema inferior to left eye with purulent discharge.  Concern for conjunctivitis, orbital vs periorbital cellulitis.  CT, labs, antibiotics.

## 2023-01-04 NOTE — ED ADULT NURSE NOTE - OBJECTIVE STATEMENT
Pt. received alert and oriented x3 with chief complaint of left eye swelling w/ discharge for 2 days.

## 2023-01-04 NOTE — ED PROVIDER NOTE - PATIENT PORTAL LINK FT
You can access the FollowMyHealth Patient Portal offered by [facility] by registering at the following website: [email or web address]. By joining [employer]’s FollowMyHealth portal, you will also be able to view your health information using other applications (apps) compatible with our system.

## 2023-01-04 NOTE — ED ADULT TRIAGE NOTE - MODE OF ARRIVAL
Problem: Physical Therapy Goal  Goal: Physical Therapy Goal  Goals to be met by: 2017     Patient will increase functional independence with mobility by performin. Supine to sit with Modified Hood  2. Sit to stand transfer with Modified Hood  3. Gait x 200 feet with Modified Hood using Rolling Walker.   Outcome: Ongoing (interventions implemented as appropriate)  Recommend Home with HH OT and PT progress to out patient therapies  No DME needs       Private [employer] Walk in

## 2023-01-05 RX ADMIN — AMPICILLIN SODIUM AND SULBACTAM SODIUM 3 GRAM(S): 250; 125 INJECTION, POWDER, FOR SUSPENSION INTRAMUSCULAR; INTRAVENOUS at 00:00

## 2023-01-05 RX ADMIN — SODIUM CHLORIDE 1000 MILLILITER(S): 9 INJECTION INTRAMUSCULAR; INTRAVENOUS; SUBCUTANEOUS at 00:00

## 2023-01-09 ENCOUNTER — APPOINTMENT (OUTPATIENT)
Dept: SURGICAL ONCOLOGY | Facility: CLINIC | Age: 78
End: 2023-01-09
Payer: MEDICARE

## 2023-01-09 VITALS
HEART RATE: 63 BPM | BODY MASS INDEX: 18.88 KG/M2 | RESPIRATION RATE: 16 BRPM | WEIGHT: 100 LBS | HEIGHT: 61 IN | OXYGEN SATURATION: 99 % | SYSTOLIC BLOOD PRESSURE: 121 MMHG | DIASTOLIC BLOOD PRESSURE: 74 MMHG

## 2023-01-09 PROCEDURE — 99214 OFFICE O/P EST MOD 30 MIN: CPT

## 2023-01-09 NOTE — ASSESSMENT
[FreeTextEntry1] : Clinically doing well.\par \par January 2015 breast : BI-RADS 2.\par No specific MRI follow-up recommended.\par \par November 2022:\par Bilateral mammogram and sonogram at [place]: BI-RADS 2.\par Prescription entered for November 2023.\par \par We should see her after the above imaging, sooner if needed

## 2023-01-09 NOTE — HISTORY OF PRESENT ILLNESS
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 \par \par + XRT: Dr. Pat COELLO\par Medical oncology: Dr. Alfredito CONCEPCION:  Anastrozole. \par \par She has been followed carefully clinically and is asymptomatic.\par \par + FH:\par A paternal cousin had breast cancer at age 35, and  shortly thereafter.\par A maternal cousin had breast cancer in her 50s.\par \par No relatives with ovarian cancer.\par \par Not Ashkenazi.\par \par At least 3 paternal aunts had intra-abdominal malignancies, no further details available.\par \par \par Reproductive history:\par Menarche at 14.\par  2, para 2, first birth at 31.\par Natural menopause at 50.\par No HRT.\par \par \par Genetic testing: She declined\par \par \par \par In the spring 2019 she lost her hearing in her left ear.\par She saw an ENT specialist, but does not recall the details.\par Irrespective, no cause or diagnosis was ever established.\par \par \par Internist was Dr. Lela Benitez\par NOW: DR Lit MARTE\par \par She does not have a pacemaker defibrillator.\par She takes no anticoagulants.\par \par Blood pressure is controlled with diet.\par She takes Crestor for hyperlipidemia.\par She does not see a cardiologist\par \par Osteoporosis is treated with alendronate, and vitamin D and calcium supplements.\par Her endocrinologist is at [place].\par \par She has been diagnosed as "prediabetic" by her internist.\par This is being followed clinically.\par \par  she developed diminished hearing in her left ear from an unclear etiology.\par She now wears a unilateral hearing aid.\par ENT: Dr. Arturo HENSLEY.\par \par Ophthalmology: Dr. José Antonio Lopez.\par 2023 she had a left eyes stye which is still being treated with oral and topical antibiotics.\par \par \par Gynecologist is Dr. Starla KRAMER: 2021 was unremarkable.\par \par \par Colonoscopy Dr. Andrews Grayson: 2018 was unremarkable.\par Will repeat in 5 years, , I reminded her today\par \par \par Her  passed away 2017 (see below)\par (Her  has had a very protracted course of the right carotid endarterectomy in 2016 by Dr. 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 \par He required a PEG tube for feeding purposes, but that migrated into the colon. \par He developed hematuria  from his previous prostate Procedures and required CBI and blood transfusions, Under the supervision of Dr. Gary Goldberg. )\par

## 2023-01-09 NOTE — REVIEW OF SYSTEMS
[Negative] : Integumentary [FreeTextEntry4] : Left ear hearing loss [FreeTextEntry5] : Hypertension [de-identified] : Osteoporosis [de-identified] : Slightly elevated hemoglobin A1c [FreeTextEntry1] : Breast cancer

## 2023-01-09 NOTE — REASON FOR VISIT
[Follow-Up Visit] : a follow-up visit for [Other: _____] : [unfilled] [FreeTextEntry2] : Right breast cancer treated with lumpectomy

## 2023-01-09 NOTE — PHYSICAL EXAM
[Normal] : supple, no neck mass and thyroid not enlarged [Normal Neck Lymph Nodes] : normal neck lymph nodes  [Normal Supraclavicular Lymph Nodes] : normal supraclavicular lymph nodes [Normal Axillary Lymph Nodes] : normal axillary lymph nodes [Normal] : normal appearance, no rash, nodules, vesicles, ulcers, erythema [de-identified] : Groins not examined [de-identified] : see below

## 2023-02-12 ENCOUNTER — NON-APPOINTMENT (OUTPATIENT)
Age: 78
End: 2023-02-12

## 2023-10-31 ENCOUNTER — NON-APPOINTMENT (OUTPATIENT)
Age: 78
End: 2023-10-31

## 2023-11-27 ENCOUNTER — NON-APPOINTMENT (OUTPATIENT)
Age: 78
End: 2023-11-27

## 2023-11-30 ENCOUNTER — RESULT REVIEW (OUTPATIENT)
Age: 78
End: 2023-11-30

## 2023-11-30 ENCOUNTER — OUTPATIENT (OUTPATIENT)
Dept: OUTPATIENT SERVICES | Facility: HOSPITAL | Age: 78
LOS: 1 days | End: 2023-11-30
Payer: MEDICARE

## 2023-11-30 ENCOUNTER — APPOINTMENT (OUTPATIENT)
Dept: ULTRASOUND IMAGING | Facility: CLINIC | Age: 78
End: 2023-11-30
Payer: MEDICARE

## 2023-11-30 ENCOUNTER — APPOINTMENT (OUTPATIENT)
Dept: MAMMOGRAPHY | Facility: CLINIC | Age: 78
End: 2023-11-30
Payer: MEDICARE

## 2023-11-30 DIAGNOSIS — C50.911 MALIGNANT NEOPLASM OF UNSPECIFIED SITE OF RIGHT FEMALE BREAST: ICD-10-CM

## 2023-11-30 PROCEDURE — 76641 ULTRASOUND BREAST COMPLETE: CPT | Mod: 26,50,GY

## 2023-11-30 PROCEDURE — 76641 ULTRASOUND BREAST COMPLETE: CPT

## 2023-11-30 PROCEDURE — 77067 SCR MAMMO BI INCL CAD: CPT | Mod: 26

## 2023-11-30 PROCEDURE — 77067 SCR MAMMO BI INCL CAD: CPT

## 2023-11-30 PROCEDURE — 77063 BREAST TOMOSYNTHESIS BI: CPT | Mod: 26

## 2023-11-30 PROCEDURE — 77063 BREAST TOMOSYNTHESIS BI: CPT

## 2024-01-15 ENCOUNTER — APPOINTMENT (OUTPATIENT)
Dept: SURGICAL ONCOLOGY | Facility: CLINIC | Age: 79
End: 2024-01-15
Payer: MEDICARE

## 2024-01-15 VITALS
DIASTOLIC BLOOD PRESSURE: 70 MMHG | HEIGHT: 61 IN | BODY MASS INDEX: 18.88 KG/M2 | HEART RATE: 69 BPM | OXYGEN SATURATION: 97 % | WEIGHT: 100 LBS | SYSTOLIC BLOOD PRESSURE: 120 MMHG

## 2024-01-15 DIAGNOSIS — C50.911 MALIGNANT NEOPLASM OF UNSPECIFIED SITE OF RIGHT FEMALE BREAST: ICD-10-CM

## 2024-01-15 PROCEDURE — 99214 OFFICE O/P EST MOD 30 MIN: CPT

## 2024-01-15 NOTE — HISTORY OF PRESENT ILLNESS
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  2005: RIGHT BREAST conserving therapy for stage I right breast cancer. ([id number]).   + XRT: Dr. Pat COELLO Medical oncology: Dr. Alfredito CONCEPCION:  Anastrozole.   She has been followed carefully clinically and is asymptomatic.  + FH: A paternal cousin had breast cancer at age 35, and  shortly thereafter. A maternal cousin had breast cancer in her 50s.  No relatives with ovarian cancer.  Not Ashkenazi.  At least 3 paternal aunts had intra-abdominal malignancies, no further details available.   Reproductive history: Menarche at 14.  2, para 2, first birth at 31. Natural menopause at 50. No HRT.   Genetic testing: She declined.   In the spring 2019 she lost her hearing in her left ear. She saw an ENT specialist but does not recall the details. Irrespective, no cause or diagnosis was ever established.   Internist was Dr. Lela Benitez NOW: DR Lit MARTE.  + ALLERGIC: Sulfa. Aspirin. Adhesive tape. Steri-Strips. Dermabond. Dermoplast.  She does not have a pacemaker defibrillator. She takes no anticoagulants.  Blood pressure is controlled with diet. She takes Crestor for hyperlipidemia. She does not see a cardiologist.  Osteopenia is treated with vitamin D and calcium supplements. Her endocrinologist is at [place].  She has been diagnosed as "prediabetic" by her internist. This is being followed clinically.   she developed diminished hearing in her left ear from an unclear etiology. She now wears a unilateral hearing aid. ENT: Dr. Arturo HENSLEY.  Ophthalmology: Dr. José Antonio Lopez. 2023, she had a left eyes stye which is still being treated with oral and topical antibiotics. Fall  she had slightly elevated intraocular pressures. 2024 follow-up as scheduled.   Gynecologist is Dr. Starla KRAMER: 2023 was unremarkable.   Colonoscopy Dr. Andrews Grayson: 2023 was unremarkable.   Her  passed away 2017 (see below) (Her  has had a very protracted course of the right carotid endarterectomy in 2016 by Dr. 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  He required a PEG tube for feeding purposes, but that migrated into the colon.  He developed hematuria from his previous prostate Procedures and required CBI and blood transfusions, Under the supervision of Dr. Gary Goldberg.

## 2024-01-15 NOTE — ASSESSMENT
[FreeTextEntry1] :  78-year-old lady.  March 2005: Right breast conserving surgery for stage I cancer.  Clinically doing well.  Annual breast imaging is due November/December 2024. Prescriptions entered for [place].  If asymptomatic, with normal imaging, I have asked to see her in another year, sooner if needed.  Reviewed in detail, all questions answered.

## 2024-01-15 NOTE — PHYSICAL EXAM
[Normal] : supple, no neck mass and thyroid not enlarged [Normal Neck Lymph Nodes] : normal neck lymph nodes  [Normal Supraclavicular Lymph Nodes] : normal supraclavicular lymph nodes [Normal Axillary Lymph Nodes] : normal axillary lymph nodes [Normal] : normal appearance, no rash, nodules, vesicles, ulcers, erythema [de-identified] : Groins not examined [de-identified] : see below

## 2024-01-15 NOTE — REVIEW OF SYSTEMS
[Negative] : Heme/Lymph [FreeTextEntry4] : Diminished hearing in the left ear [FreeTextEntry5] : Hypertension [de-identified] : Osteoporosis [de-identified] : Elevated hemoglobin A1c

## 2024-01-15 NOTE — REASON FOR VISIT
[Follow-Up Visit] : a follow-up visit for [Other: _____] : [unfilled] [FreeTextEntry2] : Stage I right breast cancer treated with breast conservation therapy in March 2005.

## 2024-06-25 ENCOUNTER — NON-APPOINTMENT (OUTPATIENT)
Age: 79
End: 2024-06-25

## 2024-12-16 ENCOUNTER — OUTPATIENT (OUTPATIENT)
Dept: OUTPATIENT SERVICES | Facility: HOSPITAL | Age: 79
LOS: 1 days | End: 2024-12-16
Payer: MEDICARE

## 2024-12-16 ENCOUNTER — APPOINTMENT (OUTPATIENT)
Dept: MAMMOGRAPHY | Facility: CLINIC | Age: 79
End: 2024-12-16
Payer: MEDICARE

## 2024-12-16 ENCOUNTER — RESULT REVIEW (OUTPATIENT)
Age: 79
End: 2024-12-16

## 2024-12-16 ENCOUNTER — APPOINTMENT (OUTPATIENT)
Dept: ULTRASOUND IMAGING | Facility: CLINIC | Age: 79
End: 2024-12-16
Payer: MEDICARE

## 2024-12-16 DIAGNOSIS — C50.911 MALIGNANT NEOPLASM OF UNSPECIFIED SITE OF RIGHT FEMALE BREAST: ICD-10-CM

## 2024-12-16 PROCEDURE — 76641 ULTRASOUND BREAST COMPLETE: CPT | Mod: 26,50,GA

## 2024-12-16 PROCEDURE — 77063 BREAST TOMOSYNTHESIS BI: CPT | Mod: 26

## 2024-12-16 PROCEDURE — 77067 SCR MAMMO BI INCL CAD: CPT | Mod: 26

## 2024-12-16 PROCEDURE — 76641 ULTRASOUND BREAST COMPLETE: CPT

## 2024-12-16 PROCEDURE — 77067 SCR MAMMO BI INCL CAD: CPT

## 2024-12-16 PROCEDURE — 77063 BREAST TOMOSYNTHESIS BI: CPT

## 2025-01-15 ENCOUNTER — NON-APPOINTMENT (OUTPATIENT)
Age: 80
End: 2025-01-15

## 2025-01-20 ENCOUNTER — APPOINTMENT (OUTPATIENT)
Dept: SURGICAL ONCOLOGY | Facility: CLINIC | Age: 80
End: 2025-01-20
Payer: MEDICARE

## 2025-01-20 VITALS
SYSTOLIC BLOOD PRESSURE: 125 MMHG | OXYGEN SATURATION: 99 % | HEIGHT: 59 IN | DIASTOLIC BLOOD PRESSURE: 74 MMHG | BODY MASS INDEX: 19.96 KG/M2 | HEART RATE: 66 BPM | WEIGHT: 99 LBS

## 2025-01-20 DIAGNOSIS — C50.911 MALIGNANT NEOPLASM OF UNSPECIFIED SITE OF RIGHT FEMALE BREAST: ICD-10-CM

## 2025-01-20 PROCEDURE — 99214 OFFICE O/P EST MOD 30 MIN: CPT
